# Patient Record
Sex: MALE | Race: WHITE | NOT HISPANIC OR LATINO | Employment: FULL TIME | URBAN - METROPOLITAN AREA
[De-identification: names, ages, dates, MRNs, and addresses within clinical notes are randomized per-mention and may not be internally consistent; named-entity substitution may affect disease eponyms.]

---

## 2021-12-16 ENCOUNTER — OFFICE VISIT (OUTPATIENT)
Dept: FAMILY MEDICINE CLINIC | Facility: CLINIC | Age: 41
End: 2021-12-16

## 2021-12-16 VITALS
BODY MASS INDEX: 21.82 KG/M2 | HEIGHT: 68 IN | TEMPERATURE: 100 F | WEIGHT: 144 LBS | RESPIRATION RATE: 18 BRPM | OXYGEN SATURATION: 99 % | DIASTOLIC BLOOD PRESSURE: 98 MMHG | SYSTOLIC BLOOD PRESSURE: 152 MMHG | HEART RATE: 90 BPM

## 2021-12-16 DIAGNOSIS — F41.9 ANXIETY AND DEPRESSION: Primary | ICD-10-CM

## 2021-12-16 DIAGNOSIS — F32.A ANXIETY AND DEPRESSION: Primary | ICD-10-CM

## 2021-12-16 PROCEDURE — 99203 OFFICE O/P NEW LOW 30 MIN: CPT | Performed by: FAMILY MEDICINE

## 2021-12-16 RX ORDER — ESCITALOPRAM OXALATE 10 MG/1
10 TABLET ORAL DAILY
Qty: 30 TABLET | Refills: 1 | Status: SHIPPED | OUTPATIENT
Start: 2021-12-16 | End: 2022-01-21 | Stop reason: SDUPTHER

## 2021-12-16 RX ORDER — HYDROXYZINE PAMOATE 25 MG/1
25 CAPSULE ORAL 3 TIMES DAILY PRN
Qty: 30 CAPSULE | Refills: 2 | Status: SHIPPED | OUTPATIENT
Start: 2021-12-16 | End: 2022-02-22 | Stop reason: SDUPTHER

## 2021-12-16 NOTE — PROGRESS NOTES
Chief Complaint  Stress (work, split with wife after 20 years, took kid with her) and Anxiety    Subjective          Jerome Alcantar presents to [facility]     The patient has consented to being recorded using SERGIO.    History of Present Illness  The patient presents today to establish as a new patient.    Anxiety and depression  The patient presents today to establish primary care. He reports experiencing anxiety and stress for approximately 4 years. This has recently worsened due to a recent divorce. The patient was prescribed lamotrigine and buspirone by Dr. Arcos in the past. He took both medicatons for a couple of months without relief; he believes the medications made him feel worse. He is unsure if he has bipolar disorder. The patient describes having long-term depression. He said he feels depressed, but denies thoughts of suicide. The patient states that he is not sleeping well and has decreased appetite. He has lost 10 to 15 pounds. He has missed a few days of work due to his depression. He is willing to try a different medication for his anxiety and depression. He admits to experiencing chest pain and shortness of breath, as well as diarrhea. He denies any other medical problems.     Elevated Blood Pressure  The patient does note his blood pressure has been recently elevated. It is 152/98 mmHg in the office today.    Health Maintenance  The patient has not been to counseling in the past. He is agreeable to attending therapy.    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 He reports a family history of depression in both his parents. The patient smokes 1/2 pack of cigarettes a day. He denies illicit drug use. He said he drinks 1 to 2 beers a week.        Review of Systems   Constitutional: Positive for unexpected weight loss.   HENT: Negative.    Cardiovascular: Positive for chest pain (sharp).   Gastrointestinal: Positive for  "diarrhea. Negative for nausea.   Musculoskeletal: Negative.    Neurological: Negative.    Psychiatric/Behavioral: Positive for depressed mood. The patient is nervous/anxious.         Objective       Vital Signs:   /98   Pulse 90   Temp 100 °F (37.8 °C)   Resp 18   Ht 172.7 cm (68\")   Wt 65.3 kg (144 lb)   SpO2 99%   BMI 21.90 kg/m²       Physical Exam  Vitals and nursing note reviewed.   Constitutional:       General: He is not in acute distress.     Appearance: Normal appearance. He is well-developed. He is not ill-appearing.   HENT:      Head: Normocephalic and atraumatic.      Right Ear: Hearing, tympanic membrane, ear canal and external ear normal.      Left Ear: Hearing, tympanic membrane, ear canal and external ear normal.      Nose: Nose normal. No congestion or rhinorrhea.      Mouth/Throat:      Mouth: Mucous membranes are moist.      Pharynx: No oropharyngeal exudate or posterior oropharyngeal erythema.   Eyes:      General: Lids are normal.      Conjunctiva/sclera: Conjunctivae normal.      Pupils: Pupils are equal, round, and reactive to light.   Neck:      Thyroid: No thyromegaly.   Cardiovascular:      Rate and Rhythm: Normal rate and regular rhythm.      Heart sounds: Normal heart sounds. No murmur heard.  No friction rub.   Pulmonary:      Effort: Pulmonary effort is normal. No respiratory distress.      Breath sounds: Normal breath sounds. No wheezing or rales.   Abdominal:      General: Bowel sounds are normal. There is no distension.      Palpations: Abdomen is soft. There is no mass.      Tenderness: There is no abdominal tenderness. There is no guarding or rebound.   Musculoskeletal:      Cervical back: Normal range of motion and neck supple.      Right lower leg: No edema.      Left lower leg: No edema.   Skin:     General: Skin is warm and dry.   Neurological:      General: No focal deficit present.      Mental Status: He is alert.   Psychiatric:         Mood and Affect: Mood is " anxious and depressed.         Speech: Speech normal.          Result Review :                     Assessment and Plan    Diagnoses and all orders for this visit:    1. Anxiety and depression (Primary)  -     escitalopram (Lexapro) 10 MG tablet; Take 1 tablet by mouth Daily.  Dispense: 30 tablet; Refill: 1  -     hydrOXYzine pamoate (VISTARIL) 25 MG capsule; Take 1 capsule by mouth 3 (Three) Times a Day As Needed for Anxiety.  Dispense: 30 capsule; Refill: 2  -     Ambulatory Referral to Behavioral Health          1. Anxiety and depression        - The patient will start Lexapro 10 mg daily. I explained the side effects and advised him to call the office if he develops any  suicidal ideation.        - We will also get him setup with our counselor here in the office.        - In addition, he will start hydroxyzine 25 mg up to 3 times a day for anxiety. Side effects, including sedation, were explained.    He will follow up in 1 month for recheck and, again, sooner if his symptoms worsen.     DISCUSSION        Follow Up   Return in about 1 month (around 1/16/2022).    Patient was given instructions and counseling regarding his condition or for health maintenance advice. Please see specific information pulled into the AVS if appropriate.       Blayne Nuno MD     Transcribed from ambient dictation for Blayne Nuno MD by Shannon Goins.  12/17/21   00:46 EST    Patient verbalized consent to the visit recording.  I have personally performed the services described in this document as transcribed by the above individual, and it is both accurate and complete.  Blayne Nuno MD  12/17/2021  08:21 EST

## 2022-01-21 ENCOUNTER — OFFICE VISIT (OUTPATIENT)
Dept: FAMILY MEDICINE CLINIC | Facility: CLINIC | Age: 42
End: 2022-01-21

## 2022-01-21 VITALS
TEMPERATURE: 98.7 F | SYSTOLIC BLOOD PRESSURE: 118 MMHG | DIASTOLIC BLOOD PRESSURE: 70 MMHG | RESPIRATION RATE: 18 BRPM | HEIGHT: 68 IN | BODY MASS INDEX: 21.98 KG/M2 | WEIGHT: 145 LBS | HEART RATE: 86 BPM

## 2022-01-21 DIAGNOSIS — F32.A ANXIETY AND DEPRESSION: Primary | ICD-10-CM

## 2022-01-21 DIAGNOSIS — F41.9 ANXIETY AND DEPRESSION: Primary | ICD-10-CM

## 2022-01-21 PROCEDURE — 99213 OFFICE O/P EST LOW 20 MIN: CPT | Performed by: FAMILY MEDICINE

## 2022-01-21 RX ORDER — ESCITALOPRAM OXALATE 10 MG/1
10 TABLET ORAL DAILY
Qty: 30 TABLET | Refills: 2 | Status: SHIPPED | OUTPATIENT
Start: 2022-01-21 | End: 2022-07-21

## 2022-01-21 RX ORDER — BUPROPION HYDROCHLORIDE 150 MG/1
TABLET, EXTENDED RELEASE ORAL
Qty: 30 TABLET | Refills: 2 | Status: SHIPPED | OUTPATIENT
Start: 2022-01-21 | End: 2022-02-22

## 2022-01-21 NOTE — PROGRESS NOTES
"Chief Complaint  Anxiety (1 month f/u ) and Depression    Subjective          Jerome Alcanatr presents to [facility]  History of Present Illness  The patient consents to being recorded using SERGIO.    Anxiety and depression  The patient reports that the Lexapro has not been having a positive effect on his depression. In fact, he states that he is feeling more irritable than previously. He has also tried taking hydroxyzine without any effect. Regarding his weight, this has stabilized, and he endorses introducing more fruits and vegetables into his diet. The patient is reportedly not sleeping much, as he is currently working the night shift. He states that he is sleeping in 15 to 20 minute intervals, as it is difficult for him to sleep during the daytime. He denies that he has missed work as of late. When asked about his current stress level, he states that this has not changed. He is still having financial difficulties which is contributing to his stress. He denies having any seizures in the past. The patient states that he is still feeling anxious on occasion. He denies any suicidal ideations at this time. He has not taken Wellbutrin in the past. 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 Additionally, he states that he has not received a call from behavioral health; although, our records show that they did reach out to him several times. He believes he may have declined the call if he did not recognize the phone number.     Review of Systems   A review of systems was performed, and the pertinent positives are noted in the HPI.    Objective       Vital Signs:   /70   Pulse 86   Temp 98.7 °F (37.1 °C)   Resp 18   Ht 172.7 cm (68\")   Wt 65.8 kg (145 lb)   BMI 22.05 kg/m²       Physical Exam  Vitals and nursing note reviewed.   Constitutional:       " Appearance: He is well-developed.   HENT:      Head: Normocephalic and atraumatic.      Right Ear: External ear normal.      Left Ear: External ear normal.   Eyes:      Pupils: Pupils are equal, round, and reactive to light.   Cardiovascular:      Rate and Rhythm: Normal rate and regular rhythm.      Heart sounds: Normal heart sounds.   Pulmonary:      Effort: Pulmonary effort is normal. No respiratory distress.      Breath sounds: Normal breath sounds. No wheezing or rales.   Skin:     General: Skin is warm and dry.   Neurological:      Mental Status: He is alert.   Psychiatric:         Behavior: Behavior normal.          Result Review :                       Assessment and Plan    Diagnoses and all orders for this visit:    1. Anxiety and depression (Primary)      - Not much improvement with Lexapro 10 mg and actually feeling a little bit irritable with that. We will add Wellbutrin  mg one a day for 1 week and then 1 twice a day. He will continue Lexapro 10 mg daily for now. We will also follow up and get him scheduled with counseling. He reports he did not get a call back on that, but it looks like they left him several messages. We will try and get him scheduled for that today. He will follow up in 1 month for recheck, sooner with any problems.        DISCUSSION        Follow Up   Return in about 1 month (around 2/21/2022).    Patient was given instructions and counseling regarding his condition or for health maintenance advice. Please see specific information pulled into the AVS if appropriate.       Blayne Nuno MD       Patient verbalized consent to the visit recording.  I have personally performed the services described in this document as transcribed by the above individual, and it is both accurate and complete.  Blayne Nuno MD  1/21/2022  18:22 EST     Transcribed from ambient dictation for Blayne Nuno MD by Matilde Hernandez.  01/21/22   13:51 EST    Patient verbalized consent to the visit  recording.  I have personally performed the services described in this document as transcribed by the above individual, and it is both accurate and complete.  Blayne Nuno MD  1/21/2022  18:22 EST

## 2022-01-21 NOTE — PROGRESS NOTES
"Chief Complaint  Anxiety (1 month f/u ) and Depression    Subjective     {Problem List  Visit Diagnosis   Encounters  Notes  Medications  Labs  Result Review Imaging  Media :23}     Jerome Alcantar presents to [facility]  History of Present Illness    The patient consents to being recorded using SERGIO.    Anxiety and depression  The patient reports that the Lexapro has not been having a positive effect on his depression. In fact, he states that he is feeling more irritable than previously. He has also tried taking hydroxyzine without any effect. Regarding his weight, this has stabilized, and he endorses introducing more fruits and vegetables into his diet. The patient is reportedly not sleeping much, as he is currently working the night shift. He states that he is sleeping in 15 to 20 minute intervals, as it is difficult for him to sleep during the daytime. He denies that he has missed work as of late. When asked about his current stress level, he states that this has not changed. He is still having financial difficulties which is contributing to his stress. He denies having any seizures in the past. The patient states that he is still feeling anxious on occasion. He denies any suicidal ideations at this time. He has not taken Wellbutrin in the past. 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 Additionally, he states that he has not received a call from behavioral health; although, our records show that they did reach out to him several times. He believes he may have declined the call if he did not recognize the phone number.       Review of Systems   A review of systems was performed, and the pertinent positives are noted in the HPI.    Objective       Vital Signs:   /70   Pulse 86   Temp 98.7 °F (37.1 °C)   Resp 18   Ht 172.7 cm (68\")   Wt " "65.8 kg (145 lb)   BMI 22.05 kg/m²       Physical Exam     Result Review :{Labs  Result Review  Imaging  Med Tab  Media  Procedures :23}   {The following data was reviewed by (Optional):31448}    {Ambulatory Labs (Optional):92323}  {Data reviewed (Optional):54057:::1}              Assessment and Plan {CC Problem List  Visit Diagnosis   ROS  Review (Popup)  Health Maintenance  Quality  BestPractice  Medications  SmartSets  SnapShot Encounters  Media :23}   Diagnoses and all orders for this visit:    1. Anxiety and depression (Primary)        - Not much improvement with Lexapro 10 mg and actually feeling a little bit irritable with that. We will add Wellbutrin  mg one a day for 1 week and then 1 twice a day. He will continue Lexapro 10 mg daily for now. We will also follow up and get him scheduled with counseling. He reports he did not get a call back on that, but it looks like they left him several messages. We will try and get him scheduled for that today. He will follow up in 1 month for recheck, sooner with any problems.    {Time Spent (Optional):51923}    DISCUSSION    ***    Ernesto dated *** was reviewed and appropriate.     Follow Up {Instructions Charge Capture  Follow-up Communications :23}  Return in about 1 month (around 2/21/2022).    Patient was given instructions and counseling regarding his condition or for health maintenance advice. Please see specific information pulled into the AVS if appropriate.       Blayne Nuno MD      Transcribed from ambient dictation for Blayne Nuno MD by Matilde Hernandez   01/21/22   11:39 EST    {SERGIO Provider Statement:52401::\"Patient verbalized consent to the visit recording.\"}      "

## 2022-02-22 ENCOUNTER — OFFICE VISIT (OUTPATIENT)
Dept: FAMILY MEDICINE CLINIC | Facility: CLINIC | Age: 42
End: 2022-02-22

## 2022-02-22 VITALS
BODY MASS INDEX: 23.04 KG/M2 | HEIGHT: 68 IN | SYSTOLIC BLOOD PRESSURE: 136 MMHG | WEIGHT: 152 LBS | TEMPERATURE: 97.8 F | RESPIRATION RATE: 18 BRPM | HEART RATE: 78 BPM | DIASTOLIC BLOOD PRESSURE: 82 MMHG

## 2022-02-22 DIAGNOSIS — F32.A ANXIETY AND DEPRESSION: ICD-10-CM

## 2022-02-22 DIAGNOSIS — F32.89 OTHER DEPRESSION: Primary | ICD-10-CM

## 2022-02-22 DIAGNOSIS — G47.09 OTHER INSOMNIA: ICD-10-CM

## 2022-02-22 DIAGNOSIS — F41.9 ANXIETY AND DEPRESSION: ICD-10-CM

## 2022-02-22 PROCEDURE — 99213 OFFICE O/P EST LOW 20 MIN: CPT | Performed by: FAMILY MEDICINE

## 2022-02-22 RX ORDER — HYDROXYZINE PAMOATE 50 MG/1
50 CAPSULE ORAL 3 TIMES DAILY PRN
Qty: 30 CAPSULE | Refills: 2 | Status: SHIPPED | OUTPATIENT
Start: 2022-02-22 | End: 2022-04-11

## 2022-02-22 RX ORDER — QUETIAPINE FUMARATE 25 MG/1
25 TABLET, FILM COATED ORAL NIGHTLY
Qty: 30 TABLET | Refills: 2 | Status: SHIPPED | OUTPATIENT
Start: 2022-02-22 | End: 2022-03-22

## 2022-02-22 NOTE — ASSESSMENT & PLAN NOTE
- We will continue Lexapro 10 mg daily. He has stopped Wellbutrin due to side effects. Increase hydroxyzine to 50 mg 3 times a day as needed and will add Seroquel 25 mg at bedtime. He will let me know in 1 week on how it is working and we may need to increase the dose. Encouraged him to call and let me know before his next visit. He agrees. Side effects explained including sedation.

## 2022-02-22 NOTE — PROGRESS NOTES
"   Chief Complaint  Anxiety (1 month f/u) and Depression    Subjective          Jerome Alcantar presents to [facility]  History of Present Illness     The patient consents to being recorded using SERGIO.      The patient was last seen on 01/21/2022 for follow-up of depression, not much improvement with Lexapro 10 mg at that time, so we added Wellbutrin  mg up to 1 tablet twice a day. He is here today for follow-up.    Depression with decreased sleep  The patient reports that he does not take the Wellbutrin. He states that he stopped taking it because it made him more irritable. He reports that he is still taking the Lexapro, but it is still not working. He reports that his nerves are still shot. He reports that he is trying to stay on third shift as much as he can so he can just stay tired. The patient reports that he does not sleep during the day. He reports that he takes the hydroxyzine 2 to 3 tablets as needed. He reports that it helps when he takes it. He states that he feels more anxious, depressed, and irritable. He denies that he can not sleep at all. 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 He denies any thoughts of wanting to hurt himself.    Smoking cessation  The patient reports that he has decreased his smoking since being on Wellbutrin; however, the Wellbutrin seems to cause him to become more irritable.      Review of Systems   All other systems reviewed and are negative.       Objective       Vital Signs:   /82   Pulse 78   Temp 97.8 °F (36.6 °C)   Resp 18   Ht 172.7 cm (68\")   Wt 68.9 kg (152 lb)   BMI 23.11 kg/m²     Physical Exam  Vitals and nursing note reviewed.   Constitutional:       Appearance: He is well-developed.   HENT:      Head: Normocephalic and atraumatic.      Right Ear: External ear normal.      Left Ear: External ear normal.   Eyes:      Pupils: Pupils are equal, round, and reactive " to light.   Pulmonary:      Effort: Pulmonary effort is normal.   Skin:     General: Skin is warm and dry.   Neurological:      Mental Status: He is alert.   Psychiatric:         Mood and Affect: Mood normal.         Behavior: Behavior normal.          Result Review :                     Assessment and Plan    Diagnoses and all orders for this visit:    1. Other depression (Primary)  Assessment & Plan:  - We will continue Lexapro 10 mg daily. He has stopped Wellbutrin due to side effects. Increase hydroxyzine to 50 mg 3 times a day as needed and will add Seroquel 25 mg at bedtime. He will let me know in 1 week on how it is working and we may need to increase the dose. Encouraged him to call and let me know before his next visit. He agrees. Side effects explained including sedation.    Orders:  -     QUEtiapine (SEROquel) 25 MG tablet; Take 1 tablet by mouth Every Night.  Dispense: 30 tablet; Refill: 2    2. Other insomnia    3. Anxiety and depression  -     hydrOXYzine pamoate (VISTARIL) 50 MG capsule; Take 1 capsule by mouth 3 (Three) Times a Day As Needed for Anxiety.  Dispense: 30 capsule; Refill: 2        DISCUSSION        Follow Up   Return in about 1 month (around 3/22/2022).    Patient was given instructions and counseling regarding his condition or for health maintenance advice. Please see specific information pulled into the AVS if appropriate.       Transcribed from ambient dictation for Blayne Nuno MD by Savi Bose.  02/22/22   12:10 EST

## 2022-03-22 ENCOUNTER — OFFICE VISIT (OUTPATIENT)
Dept: FAMILY MEDICINE CLINIC | Facility: CLINIC | Age: 42
End: 2022-03-22

## 2022-03-22 VITALS
BODY MASS INDEX: 23.79 KG/M2 | SYSTOLIC BLOOD PRESSURE: 142 MMHG | WEIGHT: 157 LBS | DIASTOLIC BLOOD PRESSURE: 80 MMHG | RESPIRATION RATE: 18 BRPM | TEMPERATURE: 98.7 F | HEART RATE: 70 BPM | HEIGHT: 68 IN

## 2022-03-22 DIAGNOSIS — G47.09 OTHER INSOMNIA: ICD-10-CM

## 2022-03-22 DIAGNOSIS — F32.89 OTHER DEPRESSION: Primary | ICD-10-CM

## 2022-03-22 PROCEDURE — 99213 OFFICE O/P EST LOW 20 MIN: CPT | Performed by: FAMILY MEDICINE

## 2022-03-22 RX ORDER — QUETIAPINE FUMARATE 50 MG/1
50 TABLET, FILM COATED ORAL NIGHTLY
Qty: 30 TABLET | Refills: 2 | Status: SHIPPED | OUTPATIENT
Start: 2022-03-22 | End: 2022-07-21

## 2022-03-22 NOTE — PROGRESS NOTES
"Chief Complaint  Depression (1 month f/u)    Subjective          Jerome Alcantar presents to [facility]  History of Present Illness    Insomnia  The patient presents today stating that he has been taking the Seroquel but has still been unable to sleep. He states that he is only taking 1 tablet. He reports that he is unable to tell if the Seroquel makes him feel better in terms of his depression. He wakes up with his mind racing. He denies that it is making him overly tired. He states that he is still taking Lexapro. He states that he has tried the hydroxyzine, but it does not calm him down. He denies any thoughts of harming himself. He states that this has been going on for years, but it has gotten worse now because he is getting . He states that he is working alternating shifts. He states that he worked yesterday during the daytime and he has to go back in tonight. He is a supervisor at [employer]. He does try to sleep a little bit during the day., but after 30 minutes he wakes up.    Review of Systems   Constitutional: Positive for fatigue.   HENT: Negative.    Respiratory: Negative.    Cardiovascular: Negative.    Psychiatric/Behavioral: Positive for sleep disturbance and depressed mood. The patient is nervous/anxious.         Objective       Vital Signs:   /80   Pulse 70   Temp 98.7 °F (37.1 °C)   Resp 18   Ht 172.7 cm (68\")   Wt 71.2 kg (157 lb)   BMI 23.87 kg/m²     Physical Exam  Vitals and nursing note reviewed.   Constitutional:       Appearance: He is well-developed.   HENT:      Head: Normocephalic and atraumatic.      Right Ear: External ear normal.      Left Ear: External ear normal.   Eyes:      Pupils: Pupils are equal, round, and reactive to light.   Cardiovascular:      Rate and Rhythm: Normal rate and regular rhythm.      Heart sounds: Normal heart sounds.   Pulmonary:      Effort: Pulmonary effort is normal. No respiratory distress.      Breath sounds: " Normal breath sounds. No wheezing or rales.   Skin:     General: Skin is warm and dry.   Neurological:      Mental Status: He is alert.   Psychiatric:         Behavior: Behavior normal.          Result Review :{Labs  Result Review  Imaging  Med Tab  Media  Procedures :23}                     Assessment and Plan    Diagnoses and all orders for this visit:    1. Other depression (Primary)  -     QUEtiapine (SEROquel) 50 MG tablet; Take 1 tablet by mouth Every Night.  Dispense: 30 tablet; Refill: 2    2. Other insomnia          DISCUSSION    1. Depression with insomnia  - We will increase the Seroquel from 25 mg to 50 mg at bedtime. He will call in 1 to 2 weeks with update and we will adjust medication over the phone until we get this improved. He does not really wish to go to counseling at this time. He denies any suicidal ideation. Follow up in 2 months, but again he will call with update so we can adjust medication medication.        Follow Up   Return in about 2 months (around 5/22/2022).    Patient was given instructions and counseling regarding his condition or for health maintenance advice. Please see specific information pulled into the AVS if appropriate.     Transcribed from ambient dictation for Blayne Nuno MD by Savi Bose  03/22/22   11:11 EDT    Patient verbalized consent to the visit recording.  I have personally performed the services described in this document as transcribed by the above individual, and it is both accurate and complete.  Blayne Nuno MD  3/24/2022  15:01 EDT        Blayne Nuno MD

## 2022-04-11 ENCOUNTER — TELEPHONE (OUTPATIENT)
Dept: FAMILY MEDICINE CLINIC | Facility: CLINIC | Age: 42
End: 2022-04-11

## 2022-04-11 DIAGNOSIS — F32.A ANXIETY AND DEPRESSION: ICD-10-CM

## 2022-04-11 DIAGNOSIS — F41.9 ANXIETY AND DEPRESSION: ICD-10-CM

## 2022-04-11 RX ORDER — HYDROXYZINE PAMOATE 50 MG/1
CAPSULE ORAL
Qty: 30 CAPSULE | Refills: 2 | Status: SHIPPED | OUTPATIENT
Start: 2022-04-11 | End: 2022-07-21 | Stop reason: SDUPTHER

## 2022-04-11 RX ORDER — BUPROPION HYDROCHLORIDE 150 MG/1
TABLET, EXTENDED RELEASE ORAL
Qty: 30 TABLET | Refills: 2 | OUTPATIENT
Start: 2022-04-11

## 2022-04-11 NOTE — TELEPHONE ENCOUNTER
Please call.  We need to increase his Seroquel to 100 mg daily.  Since has a 50 mg dose, take 2 of those and let me know on Friday if not improving.

## 2022-04-11 NOTE — TELEPHONE ENCOUNTER
Caller: Jerome Alcantar    Relationship: Self    Best call back number: [phone]    What is the best time to reach you: AS SOON AS POSSIBLE    Who are you requesting to speak with (clinical staff, provider,  specific staff member): CLINICAL    Do you know the name of the person who called:     What was the call regarding: PATIENT STATED HE WAS TO LET DOCTOR IRINEO KNOW HOW THE MEDICATIONS ARE WORKING FOR HIM AFTER A COUPLE OF WEEKS.      PATIENT STATED hydrOXYzine pamoate (VISTARIL) 50 MG capsule AND    QUEtiapine (SEROquel) 50 MG tablet     ARE NOT HELPING.    Do you require a callback: YES

## 2022-07-21 ENCOUNTER — TELEMEDICINE (OUTPATIENT)
Dept: FAMILY MEDICINE CLINIC | Facility: CLINIC | Age: 42
End: 2022-07-21

## 2022-07-21 DIAGNOSIS — F41.9 ANXIETY AND DEPRESSION: ICD-10-CM

## 2022-07-21 DIAGNOSIS — F32.A ANXIETY AND DEPRESSION: ICD-10-CM

## 2022-07-21 PROCEDURE — 99213 OFFICE O/P EST LOW 20 MIN: CPT | Performed by: FAMILY MEDICINE

## 2022-07-21 RX ORDER — DULOXETIN HYDROCHLORIDE 60 MG/1
60 CAPSULE, DELAYED RELEASE ORAL DAILY
Qty: 30 CAPSULE | Refills: 2 | Status: SHIPPED | OUTPATIENT
Start: 2022-07-21 | End: 2022-09-13

## 2022-07-21 RX ORDER — DULOXETIN HYDROCHLORIDE 30 MG/1
30 CAPSULE, DELAYED RELEASE ORAL DAILY
Qty: 7 CAPSULE | Refills: 0 | Status: SHIPPED | OUTPATIENT
Start: 2022-07-21 | End: 2022-09-13 | Stop reason: SDUPTHER

## 2022-07-21 RX ORDER — HYDROXYZINE PAMOATE 50 MG/1
50 CAPSULE ORAL 3 TIMES DAILY PRN
Qty: 30 CAPSULE | Refills: 2 | Status: SHIPPED | OUTPATIENT
Start: 2022-07-21

## 2022-07-21 NOTE — PROGRESS NOTES
This was an audio and video enabled telemedicine encounter.     You have chosen to receive care through a telehealth visit.  Do you consent to use a video/audio connection for your medical care today? Yes     Time spent: 8 min total with patient in discussion    Patient was in their home and I was in my home office.          Chief Complaint  Depression    Subjective          Jerome Alcantar presents to [facility]  History of Present Illness    Depression with mood swings  The patient reports that he has not been doing well and his depression has gotten worse. He states that he has a lot of stress at work and is getting ready to switch jobs, which is a big life change, along with his divorce. He states that his nerves are sharp. He states that one day he is busy and happy and the next day he does not care if he gets off the couch. He states that he has just noticed that over the last couple of months. He states that he has not been taking the Lexapro this week because he has not been to get it filled. He states that he is not taking Seroquel because it made him so sleepy he could not function. He states that he was on lamotrigine a long time ago, which did not do any good either. He states that he has never heard of Cymbalta. He states that he is not sleeping well unless he wears himself out. He states that it takes 2 to 3 days before he is worn down enough to where he can sleep. He denies any thoughts of wanting to hurt himself and says it has been a long time since he has did. He states that he has no appetite and has lost 10 pounds in approximately 2 months. 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 He states that he was taking hydroxyzine, but he has to switch his pharmacies because the current one has become unreliable.     Review of Systems   Constitutional: Positive for fatigue.   HENT: Negative.    Respiratory: Negative.     Cardiovascular: Negative.    Psychiatric/Behavioral: Positive for sleep disturbance and depressed mood. Negative for suicidal ideas.        Objective       Vital Signs:   There were no vitals taken for this visit.    Physical Exam  Vitals and nursing note reviewed.   Constitutional:       Appearance: Normal appearance. He is well-developed.   Pulmonary:      Effort: Pulmonary effort is normal.   Neurological:      Mental Status: He is alert.   Psychiatric:         Mood and Affect: Mood normal.         Behavior: Behavior normal.          Result Review :                     Assessment and Plan    Diagnoses and all orders for this visit:    1. Anxiety and depression  -     hydrOXYzine pamoate (VISTARIL) 50 MG capsule; Take 1 capsule by mouth 3 (Three) Times a Day As Needed for Anxiety.  Dispense: 30 capsule; Refill: 2  -     DULoxetine (CYMBALTA) 30 MG capsule; Take 1 capsule by mouth Daily. For 1 week and then start 60 mg dose  Dispense: 7 capsule; Refill: 0  -     DULoxetine (CYMBALTA) 60 MG capsule; Take 1 capsule by mouth Daily. Start after completion of 30 mg dose  Dispense: 30 capsule; Refill: 2      1. Depression with mood swings  - He previously had been on lamotrigine and it was not helpful. Lexapro has not been helpful and Seroquel just caused increased sedation. We will try Cymbalta 30 mg 1 a day for 1 week and then 60 mg a day after that. May need dual receptor treatment. If that is not helpful, then may need to consider adding Depakote since Seroquel was not helpful. another issue may be the sleep deprivation with abnormal sleep patterns. He is going to be starting a new job in the next 2 weeks with a more stable sleep pattern. He will let me know in 1 month how he is doing regardless. Call sooner if worsening. He agrees.        DISCUSSION        Follow Up   Return in about 6 weeks (around 9/1/2022).    Patient was given instructions and counseling regarding his condition or for health maintenance  advice. Please see specific information pulled into the AVS if appropriate.       Blayne Nuno MD     Transcribed from ambient dictation for Blayne Nuno MD by АНДРЕЙ BLAKE.  07/21/22   17:07 EDT    Patient verbalized consent to the visit recording.  I have personally performed the services described in this document as transcribed by the above individual, and it is both accurate and complete.  Blayne Nuno MD  7/24/2022  15:31 EDT

## 2022-09-13 ENCOUNTER — TELEPHONE (OUTPATIENT)
Dept: FAMILY MEDICINE CLINIC | Facility: CLINIC | Age: 42
End: 2022-09-13

## 2022-09-13 ENCOUNTER — NURSE TRIAGE (OUTPATIENT)
Dept: CALL CENTER | Facility: HOSPITAL | Age: 42
End: 2022-09-13

## 2022-09-13 DIAGNOSIS — F41.9 ANXIETY AND DEPRESSION: ICD-10-CM

## 2022-09-13 DIAGNOSIS — F32.A ANXIETY AND DEPRESSION: ICD-10-CM

## 2022-09-13 RX ORDER — DULOXETIN HYDROCHLORIDE 30 MG/1
30 CAPSULE, DELAYED RELEASE ORAL DAILY
Qty: 30 CAPSULE | Refills: 1 | Status: SHIPPED | OUTPATIENT
Start: 2022-09-13
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
## 2022-09-13 NOTE — TELEPHONE ENCOUNTER
Please call.  Which medication is he speaking of?  I have not seen him since July 21 and he did not come in for his follow-up on August 19.

## 2022-09-13 NOTE — TELEPHONE ENCOUNTER
"    Reason for Disposition  • [1] Follow-up call to recent contact AND [2] information only call, no triage required    Additional Information  • Negative: [1] Caller is not with the adult (patient) AND [2] reporting urgent symptoms  • Negative: Lab result questions  • Negative: Medication questions  • Negative: Caller can't be reached by phone  • Negative: Caller has already spoken to PCP or another triager  • Negative: RN needs further essential information from caller in order to complete triage  • Negative: Requesting regular office appointment  • Negative: [1] Caller requesting NON-URGENT health information AND [2] PCP's office is the best resource  • Negative: Health Information question, no triage required and triager able to answer question  • Negative: General information question, no triage required and triager able to answer question  • Negative: Question about upcoming scheduled test, no triage required and triager able to answer question  • Negative: [1] Caller is not with the adult (patient) AND [2] probable NON-URGENT symptoms    Answer Assessment - Initial Assessment Questions  1. REASON FOR CALL or QUESTION: \"What is your reason for calling today?\" or \"How can I best help you?\" or \"What question do you have that I can help answer?\"      Mr. Alcantar had called Dr. Nuno office today to request change in dosage of his Cymbalta.  He is asking if anything had been done.  No changes noted on Epic.  He states I will be back in a minute and left me on hold for an extended time.  I hung up the telephone.    Protocols used: INFORMATION ONLY CALL - NO TRIAGE-ADULT-      "
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
## 2022-09-15 ENCOUNTER — TELEPHONE (OUTPATIENT)
Dept: FAMILY MEDICINE CLINIC | Facility: CLINIC | Age: 42
End: 2022-09-15

## 2022-09-15 ENCOUNTER — OFFICE VISIT (OUTPATIENT)
Dept: BEHAVIORAL HEALTH | Facility: CLINIC | Age: 42
End: 2022-09-15

## 2022-09-15 DIAGNOSIS — F43.10 POST TRAUMATIC STRESS DISORDER (PTSD): Primary | ICD-10-CM

## 2022-09-15 PROCEDURE — 90791 PSYCH DIAGNOSTIC EVALUATION: CPT | Performed by: SOCIAL WORKER

## 2022-09-15 NOTE — TELEPHONE ENCOUNTER
Caller: Jerome Alcantar    Relationship: Self    Best call back number: [phone]    What form or medical record are you requesting: DOCTOR'S NOTE TO BE OFF WORK     Who is requesting this form or medical record from you: PATIENT EMPLOYER     How would you like to receive the form or medical records (pick-up, mail, fax): FAX   If fax, what is the fax number: WILL CALL BACK WITH FAX NUMBER   If mail, what is the address:   If pick-up, provide patient with address and location details    Timeframe paperwork needed: ASAP    Additional notes: PATIENT WAS TOLD THAT HIS LETTER WAS READY FOR  AND HE IS ASKING THAT IT GET FAXED INSTEAD

## 2022-09-15 NOTE — TELEPHONE ENCOUNTER
Keyana Boo RegSched Rep 1 hour ago (2:06 PM)                  Caller: Jerome Alcantar     Relationship to patient: Self     Best call back number: [phone]     Patient is needing: PATIENT NEEDS HIS DOCTORS NOTE FOR BEING OFF WORK FAXED TO     FAX: [phone]  ATT: BRENNAN
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
## 2022-09-15 NOTE — TELEPHONE ENCOUNTER
Caller: Jerome Alcantar    Relationship to patient: Self    Best call back number: [phone]    Patient is needing: PATIENT NEEDS HIS DOCTORS NOTE FOR BEING OFF WORK FAXED TO    FAX: [phone]  ATT: BRENNAN

## 2022-09-20 ENCOUNTER — TELEPHONE (OUTPATIENT)
Dept: FAMILY MEDICINE CLINIC | Facility: CLINIC | Age: 42
End: 2022-09-20

## 2022-09-20 DIAGNOSIS — F43.10 PTSD (POST-TRAUMATIC STRESS DISORDER): Primary | ICD-10-CM

## 2022-09-20 NOTE — TELEPHONE ENCOUNTER
Isiah is not going to be here this week and pt stated he wznted something different medication wise, the duloxetine is not working and wanted to know if there is something that dr butts can poerscribe since isiah would be out he said he would keep taking it for now until he hears back from our office, he would not go into detail of why it wasn't working

## 2022-09-20 NOTE — TELEPHONE ENCOUNTER
Called patient stated he was decreased to 30 mg but he feels it is not helping. Stated marilyn had diagnosed him with ptsd. Would generic testing be option? Stated he feels this medication isn't helping so he don't think decreasing it would help.

## 2022-09-20 NOTE — TELEPHONE ENCOUNTER
Please call.  Unfortunately, that is why I referred him to them to help with medication management.  I would not feel comfortable in changing the medication at this point.  Did he decrease to 30 mg of Cymbalta?  Has he had any further episodes with the 30 mg dose or do we need to decrease it more?

## 2022-09-22 RX ORDER — PRAZOSIN HYDROCHLORIDE 1 MG/1
1 CAPSULE ORAL NIGHTLY
Qty: 30 CAPSULE | Refills: 1 | Status: SHIPPED | OUTPATIENT
Start: 2022-09-22

## 2022-09-22 NOTE — TELEPHONE ENCOUNTER
Please call patient.  I am sending in a medication called Prazosin .  It is helpful with PTSD symptoms to help with nightmares and hopefully help him sleep better.    Continue Cymbalta 30 mg daily until he sees behavioral health again.
